# Patient Record
Sex: FEMALE | Race: BLACK OR AFRICAN AMERICAN | NOT HISPANIC OR LATINO | Employment: FULL TIME | URBAN - METROPOLITAN AREA
[De-identification: names, ages, dates, MRNs, and addresses within clinical notes are randomized per-mention and may not be internally consistent; named-entity substitution may affect disease eponyms.]

---

## 2021-03-12 LAB — SARS-COV-2 RNA RESP QL NAA+PROBE: NEGATIVE

## 2021-10-01 ENCOUNTER — HISTORICAL (OUTPATIENT)
Dept: ADMINISTRATIVE | Facility: HOSPITAL | Age: 36
End: 2021-10-01

## 2021-10-01 LAB — SARS-COV-2 RNA RESP QL NAA+PROBE: NOT DETECTED

## 2022-04-11 ENCOUNTER — HISTORICAL (OUTPATIENT)
Dept: ADMINISTRATIVE | Facility: HOSPITAL | Age: 37
End: 2022-04-11

## 2022-04-28 VITALS
BODY MASS INDEX: 39.52 KG/M2 | SYSTOLIC BLOOD PRESSURE: 138 MMHG | OXYGEN SATURATION: 99 % | DIASTOLIC BLOOD PRESSURE: 94 MMHG | WEIGHT: 231.5 LBS | HEIGHT: 64 IN

## 2022-09-14 ENCOUNTER — HOSPITAL ENCOUNTER (EMERGENCY)
Facility: HOSPITAL | Age: 37
Discharge: HOME | End: 2022-09-14
Attending: FAMILY MEDICINE
Payer: COMMERCIAL

## 2022-09-14 VITALS
SYSTOLIC BLOOD PRESSURE: 118 MMHG | BODY MASS INDEX: 40.27 KG/M2 | DIASTOLIC BLOOD PRESSURE: 76 MMHG | OXYGEN SATURATION: 100 % | TEMPERATURE: 98 F | WEIGHT: 235.88 LBS | HEIGHT: 64 IN | RESPIRATION RATE: 16 BRPM | HEART RATE: 101 BPM

## 2022-09-14 DIAGNOSIS — J06.9 VIRAL URI WITH COUGH: Primary | ICD-10-CM

## 2022-09-14 LAB
FLUAV AG UPPER RESP QL IA.RAPID: NOT DETECTED
FLUBV AG UPPER RESP QL IA.RAPID: NOT DETECTED
SARS-COV-2 RNA RESP QL NAA+PROBE: NOT DETECTED

## 2022-09-14 PROCEDURE — 87636 SARSCOV2 & INF A&B AMP PRB: CPT | Performed by: PHYSICIAN ASSISTANT

## 2022-09-14 PROCEDURE — 99284 EMERGENCY DEPT VISIT MOD MDM: CPT | Mod: 25

## 2022-09-14 RX ORDER — IBUPROFEN 800 MG/1
800 TABLET ORAL EVERY 6 HOURS PRN
Qty: 20 TABLET | Refills: 0 | Status: SHIPPED | OUTPATIENT
Start: 2022-09-14

## 2022-09-14 RX ORDER — LORATADINE 10 MG/1
10 TABLET ORAL DAILY
Qty: 30 TABLET | Refills: 0 | Status: SHIPPED | OUTPATIENT
Start: 2022-09-14 | End: 2022-10-14

## 2022-09-14 RX ORDER — BENZONATATE 100 MG/1
100 CAPSULE ORAL 3 TIMES DAILY PRN
Qty: 20 CAPSULE | Refills: 0 | Status: SHIPPED | OUTPATIENT
Start: 2022-09-14 | End: 2022-09-24

## 2022-09-14 NOTE — Clinical Note
"Shai"Elly Crawley was seen and treated in our emergency department on 9/14/2022.  She may return to work on 09/19/2022.       If you have any questions or concerns, please don't hesitate to call.      Neva Rondon PA-C"

## 2022-09-14 NOTE — ED PROVIDER NOTES
Encounter Date: 9/14/2022       History     Chief Complaint   Patient presents with    Headache    Cough     Reports HA and cough for approx 1 week. Reports weak, not sleeping, and decreased appetite. Denies known exposure to anyone ill. Denies N/V/D. Hx HTN.      Shai Crawley is a 37 y.o. female who presents to the ED complaining of cough and congestion x 1 week. She reports she has been very fatigued with decreased appetite since symptom onset. Cough was non-productive until today she coughed up clear sputum. She denies any fevers, SOB, sore throat, rhinorrhea, N/V, abdominal pain, sick contacts.     The history is provided by the patient. No  was used.   Review of patient's allergies indicates:  No Known Allergies  No past medical history on file.  No past surgical history on file.  No family history on file.     Review of Systems   Constitutional:  Positive for chills. Negative for fever.   HENT:  Positive for congestion. Negative for rhinorrhea and sore throat.    Eyes:  Negative for photophobia and visual disturbance.   Respiratory:  Positive for cough. Negative for shortness of breath.    Cardiovascular:  Negative for palpitations and leg swelling.   Gastrointestinal:  Negative for constipation, nausea and vomiting.   Genitourinary:  Negative for dysuria, frequency and urgency.   Musculoskeletal:  Negative for back pain and gait problem.   Skin:  Negative for rash and wound.   Neurological:  Negative for syncope and weakness.     Physical Exam     Initial Vitals [09/14/22 1101]   BP Pulse Resp Temp SpO2   120/77 (!) 115 18 98.2 °F (36.8 °C) 99 %      MAP       --         Physical Exam    Nursing note and vitals reviewed.  Constitutional: She appears well-developed and well-nourished. She is not diaphoretic.   HENT:   Head: Normocephalic and atraumatic.   Eyes: EOM are normal. No scleral icterus.   Neck: Neck supple.   Normal range of motion.  Cardiovascular:  Normal rate and regular  rhythm.           No murmur heard.  Pulmonary/Chest: Breath sounds normal. No respiratory distress. She has no wheezes.   Abdominal: Abdomen is soft. She exhibits no distension.   Musculoskeletal:         General: No edema. Normal range of motion.      Cervical back: Normal range of motion and neck supple.     Neurological: She is alert and oriented to person, place, and time. She has normal strength. No cranial nerve deficit.   Skin: Skin is warm and dry. Capillary refill takes less than 2 seconds. No erythema.       ED Course   Procedures  Labs Reviewed   COVID/FLU A&B PCR - Normal          Imaging Results    None          Medications - No data to display  Medical Decision Making:   ED Management:  The patient is resting comfortably and in no acute distress. I personally discussed her test results and treatment plan.  Gave strict ED precautions and specific conditions for return to the emergency department and importance of follow up with pcp.  Patient voices understanding and agrees to the plan discussed. All patients' questions have been answered at this time. She has remained hemodynamically stable throughout entire stay in ED and is stable for discharge home.                          Clinical Impression:   Final diagnoses:  [J06.9] Viral URI with cough (Primary)      ED Disposition Condition    Discharge Stable          ED Prescriptions       Medication Sig Dispense Start Date End Date Auth. Provider    ibuprofen (ADVIL,MOTRIN) 800 MG tablet Take 1 tablet (800 mg total) by mouth every 6 (six) hours as needed for Pain. 20 tablet 9/14/2022 -- Neva Rondon PA-C    benzonatate (TESSALON) 100 MG capsule Take 1 capsule (100 mg total) by mouth 3 (three) times daily as needed for Cough. 20 capsule 9/14/2022 9/24/2022 Neva Rondon PA-C    loratadine (CLARITIN) 10 mg tablet Take 1 tablet (10 mg total) by mouth once daily. 30 tablet 9/14/2022 10/14/2022 Neva Rondon PA-C          Follow-up  Information       Follow up With Specialties Details Why Contact Info    Chasidy Albert, [facility] In 1 week Hospital follow up [address] [facility] [id number]  [phone]      [facility] Emergency Medicine  If symptoms worsen [address] [facility] [id number] [phone]             Neva Rondon PA-C  09/17/22 1411

## 2022-09-14 NOTE — Clinical Note
"Shai Carryani" Misbah was seen and treated in our emergency department on 9/14/2022.  She may return to work on 09/16/2022.       If you have any questions or concerns, please don't hesitate to call.      Denilson DILL    "

## 2022-09-22 ENCOUNTER — HISTORICAL (OUTPATIENT)
Dept: ADMINISTRATIVE | Facility: HOSPITAL | Age: 37
End: 2022-09-22
Payer: COMMERCIAL